# Patient Record
Sex: MALE | Race: BLACK OR AFRICAN AMERICAN | NOT HISPANIC OR LATINO | URBAN - METROPOLITAN AREA
[De-identification: names, ages, dates, MRNs, and addresses within clinical notes are randomized per-mention and may not be internally consistent; named-entity substitution may affect disease eponyms.]

---

## 2017-01-29 ENCOUNTER — EMERGENCY (EMERGENCY)
Facility: HOSPITAL | Age: 22
LOS: 0 days | Discharge: HOME | End: 2017-01-29
Attending: EMERGENCY MEDICINE
Payer: MEDICAID

## 2017-01-29 VITALS
OXYGEN SATURATION: 97 % | HEART RATE: 97 BPM | RESPIRATION RATE: 18 BRPM | DIASTOLIC BLOOD PRESSURE: 83 MMHG | TEMPERATURE: 99 F | SYSTOLIC BLOOD PRESSURE: 123 MMHG

## 2017-01-29 VITALS
WEIGHT: 315 LBS | OXYGEN SATURATION: 95 % | TEMPERATURE: 99 F | RESPIRATION RATE: 21 BRPM | SYSTOLIC BLOOD PRESSURE: 104 MMHG | HEIGHT: 77 IN | HEART RATE: 89 BPM | DIASTOLIC BLOOD PRESSURE: 69 MMHG

## 2017-01-29 DIAGNOSIS — J06.9 ACUTE UPPER RESPIRATORY INFECTION, UNSPECIFIED: ICD-10-CM

## 2017-01-29 DIAGNOSIS — Z91.013 ALLERGY TO SEAFOOD: ICD-10-CM

## 2017-01-29 DIAGNOSIS — R05 COUGH: ICD-10-CM

## 2017-01-29 PROCEDURE — 71020: CPT | Mod: 26

## 2017-01-29 PROCEDURE — 99284 EMERGENCY DEPT VISIT MOD MDM: CPT

## 2017-01-29 RX ORDER — IBUPROFEN 200 MG
600 TABLET ORAL ONCE
Refills: 0 | Status: COMPLETED | OUTPATIENT
Start: 2017-01-29 | End: 2017-01-29

## 2017-01-29 RX ADMIN — Medication 600 MILLIGRAM(S): at 20:44

## 2017-01-29 NOTE — ED PROVIDER NOTE - MEDICAL DECISION MAKING DETAILS
pt appears well and non-toxic. . VSS. Sx have improved during ED stay. No acute events during ED observation period. Precautions given to return to the ED if sx persist or change. Pt expresses understanding and has no further questions. Pt feels comfortable and wishes to be discharged home. Instructed to follow up with PMD in 24 hrs.

## 2017-01-29 NOTE — ED PROVIDER NOTE - PHYSICAL EXAMINATION
GEN: Alert, NAD  HEAD: atraumatic, EOMI, normal lids/conjunctiva  ENT: normal hearing, patent oropharynx without erythema/exudate, uvula midline  NECK: +supple, no tenderness/meningismus, +Trachea midline  PULM: Bilateral BS, normal resp effort, no wheeze/stridor/retractions  CV: RRR, no M/R/G, +dist ext pulses  ABD: soft, NT/ND  BACK: no CVAT, no midline tenderness  MSK: no edema/erythema/cyanosis  SKIN: no rash  NEURO: AAOx3, no sensory/motor deficits, CN 2-12 intact

## 2017-01-29 NOTE — ED PROVIDER NOTE - PRESENTING SYMPTOMS DETAILS
21M no pmhx/shx comes in w dry cough, rhinorrhea, mild sore throat, myalgias past wk or so. denies fevers/chills/n/v/d. has not seen doctor for this yet. has had a headache yesterday that resolved, no neck sitffness  ROS: No fever/chills, No photophobia/eye pain/changes in vision, No ear pain/dysphagia, No chest pain/palpitations, no SOB/wheeze/stridor, No abdominal pain/N/V/D, no dysuria/frequency/discharge, No neck/back pain, no rash, no changes in neurological status/function.

## 2017-01-29 NOTE — ED ADULT NURSE NOTE - OBJECTIVE STATEMENT
paqtient co cold seats , sweating, head congestion, neck pain, productive cough about a week. Patient did not have a flu shot this year. paqtient co hot/ cold seats , sweating, head congestion, neck pain, productive cough about a week. Patient did not have a flu shot this year.

## 2018-01-13 NOTE — ED ADULT NURSE NOTE - PRO INTERPRETER NEED 2
Message  The patient is currently taking 60 mg of Prednisone, he is doing well  He has not had any loose stools  He will take 50 mg of Prednisone starting on Thursday and will call Doreen Juarez on Monday for further instruction  I have stressed the importance of calling Doreen Juarez to report how he is feeling as well as what his next tapered dose of steroids should be  He is agreeable to this plan  Signatures   Electronically signed by : MARISEL Last;  Apr 5 2016  3:54PM EST                       (Author)
English

## 2018-03-31 ENCOUNTER — EMERGENCY (EMERGENCY)
Facility: HOSPITAL | Age: 23
LOS: 0 days | Discharge: HOME | End: 2018-03-31
Attending: EMERGENCY MEDICINE
Payer: SELF-PAY

## 2018-03-31 VITALS
WEIGHT: 315 LBS | TEMPERATURE: 97 F | RESPIRATION RATE: 19 BRPM | HEART RATE: 84 BPM | DIASTOLIC BLOOD PRESSURE: 67 MMHG | SYSTOLIC BLOOD PRESSURE: 141 MMHG | OXYGEN SATURATION: 96 % | HEIGHT: 77 IN

## 2018-03-31 DIAGNOSIS — R05 COUGH: ICD-10-CM

## 2018-03-31 DIAGNOSIS — Z91.013 ALLERGY TO SEAFOOD: ICD-10-CM

## 2018-03-31 DIAGNOSIS — F17.200 NICOTINE DEPENDENCE, UNSPECIFIED, UNCOMPLICATED: ICD-10-CM

## 2018-03-31 PROCEDURE — 99285 EMERGENCY DEPT VISIT HI MDM: CPT

## 2018-03-31 PROCEDURE — 71046 X-RAY EXAM CHEST 2 VIEWS: CPT | Mod: 26

## 2018-03-31 RX ORDER — IPRATROPIUM/ALBUTEROL SULFATE 18-103MCG
3 AEROSOL WITH ADAPTER (GRAM) INHALATION ONCE
Qty: 0 | Refills: 0 | Status: COMPLETED | OUTPATIENT
Start: 2018-03-31 | End: 2018-03-31

## 2018-03-31 RX ORDER — ALBUTEROL 90 UG/1
2 AEROSOL, METERED ORAL
Qty: 1 | Refills: 0
Start: 2018-03-31

## 2018-03-31 RX ADMIN — Medication 3 MILLILITER(S): at 17:03

## 2018-03-31 RX ADMIN — Medication 50 MILLIGRAM(S): at 17:03

## 2018-03-31 NOTE — ED ADULT NURSE NOTE - OBJECTIVE STATEMENT
patient stated that he has been coughing for the past 2 months, yellow sputum, denies fever, denies nausea, denies vomiting

## 2018-03-31 NOTE — ED PROVIDER NOTE - OBJECTIVE STATEMENT
23 y/o obese male no other pmh c/o 2 months of persistent cough. States had uri at beginning, cleared up but cough never went away. +smoking. Has some chest pain when coughing, no chest pain when not actively cough, no sob. No hx dvt/pe, no immobilization, no leg pain/swelling, no surgery/cancer, denies abd pain/n/v/d. Taking robitussin with minimal relief. No uri rhinorrhea/sore throat.    ROS: No fever/chills. No eye pain/changes in vision, No ear pain/sore throat/dysphagia, No palpitations. No SOB No abdominal pain, N/V/D, no black/bloody bm. No dysuria/frequency/discharge, No headache. No Dizziness.    No rashes or breaks in skin. No numbness/tingling/weakness.

## 2018-03-31 NOTE — ED ADULT TRIAGE NOTE - CHIEF COMPLAINT QUOTE
patient c/o of cough  for 2 months , patient stated " I have chest pain sometimes when I cough" , denied chest pain at this time , denied fever, denied pain

## 2019-04-14 ENCOUNTER — EMERGENCY (EMERGENCY)
Facility: HOSPITAL | Age: 24
LOS: 0 days | Discharge: HOME | End: 2019-04-14
Payer: MEDICAID

## 2019-04-14 VITALS
HEART RATE: 83 BPM | RESPIRATION RATE: 19 BRPM | HEIGHT: 77 IN | WEIGHT: 315 LBS | DIASTOLIC BLOOD PRESSURE: 78 MMHG | OXYGEN SATURATION: 96 % | SYSTOLIC BLOOD PRESSURE: 136 MMHG | TEMPERATURE: 98 F

## 2019-04-14 DIAGNOSIS — J02.9 ACUTE PHARYNGITIS, UNSPECIFIED: ICD-10-CM

## 2019-04-14 PROCEDURE — 99283 EMERGENCY DEPT VISIT LOW MDM: CPT

## 2019-04-14 RX ORDER — IBUPROFEN 200 MG
600 TABLET ORAL ONCE
Refills: 0 | Status: COMPLETED | OUTPATIENT
Start: 2019-04-14 | End: 2019-04-14

## 2019-04-14 RX ORDER — DEXAMETHASONE 0.5 MG/5ML
10 ELIXIR ORAL ONCE
Refills: 0 | Status: COMPLETED | OUTPATIENT
Start: 2019-04-14 | End: 2019-04-14

## 2019-04-14 RX ADMIN — Medication 10 MILLIGRAM(S): at 11:44

## 2019-04-14 RX ADMIN — Medication 600 MILLIGRAM(S): at 11:44

## 2019-04-14 NOTE — ED ADULT NURSE NOTE - OBJECTIVE STATEMENT
Patient complains of sore throat with tonsillar swelling, no exudate, cough present before sore throat, no fevers and no lymph node swelling.

## 2019-04-14 NOTE — ED ADULT NURSE NOTE - NSIMPLEMENTINTERV_GEN_ALL_ED
Implemented All Universal Safety Interventions:  [place] to call system. Call bell, personal items and telephone within reach. Instruct patient to call for assistance. Room bathroom lighting operational. Non-slip footwear when patient is off stretcher. Physically safe environment: no spills, clutter or unnecessary equipment. Stretcher in lowest position, wheels locked, appropriate side rails in place.

## 2019-04-14 NOTE — ED PROVIDER NOTE - OBJECTIVE STATEMENT
22 y/o male with no PMH here c/o sore throat x 2 days. pt states pain worse when swallowing. able to eat, just with pain. pt hasn't taken anything for the pain. also reports mild cough. no fevers. no sick contacts. pt otherwise has no other complaints.    ROS: No fever/chills. No eye pain/changes in vision, No ear pain +sore throat no dysphagia, No chest pain/palpitations. No SOB/cough/. No abdominal pain, N/V/D, no black/bloody bm. No dysuria/frequency/discharge, No headache. No Dizziness.    No rashes or breaks in skin. No numbness/tingling/weakness.

## 2019-04-14 NOTE — ED PROVIDER NOTE - PHYSICAL EXAMINATION
Gen: Alert, NAD, not toxic, well appearing  Head: NC, AT, PERRL, EOMI, normal lids/conjunctiva  ENT: B TM WNL, normal hearing, patent oropharynx without exudate, mild erythema and tonsillar enalrgement, uvula midline, no stridor, no trismus, no drooling  Neck: +supple, no tenderness/meningismus/JVD, +Trachea midline  Pulm: Bilateral BS, normal resp effort, no wheeze/stridor/retractions  CV: RRR, no M/R/G, +dist pulses  Abd: soft, NT/ND, +BS, no hepatosplenomegaly  Mskel: no edema/erythema/cyanosis  Skin: no rash  Neuro: AAOx3, no sensory/motor deficits, CN 2-12 intact

## 2019-04-14 NOTE — ED PROVIDER NOTE - CLINICAL SUMMARY MEDICAL DECISION MAKING FREE TEXT BOX
pt here with sore throat, 1 centor criteria, likely viral, will do cx, pt is well appearing, afebrile, in no distress, speaking in full sentences, vss, feels better after pain meds/decadron, will fu with pcp, return precautions given

## 2019-04-14 NOTE — ED ADULT TRIAGE NOTE - CHIEF COMPLAINT QUOTE
patient c/o of sore throat started 3 days ago , denied difficulty breathing , c/o of pain when swallowing , denied difficulty swallowing , c/o of cough denied fever

## 2019-04-16 ENCOUNTER — EMERGENCY (EMERGENCY)
Facility: HOSPITAL | Age: 24
LOS: 0 days | Discharge: HOME | End: 2019-04-16
Payer: MEDICAID

## 2019-04-16 VITALS
OXYGEN SATURATION: 100 % | SYSTOLIC BLOOD PRESSURE: 120 MMHG | HEIGHT: 77 IN | RESPIRATION RATE: 19 BRPM | TEMPERATURE: 98 F | HEART RATE: 96 BPM | WEIGHT: 315 LBS | DIASTOLIC BLOOD PRESSURE: 84 MMHG

## 2019-04-16 DIAGNOSIS — J02.9 ACUTE PHARYNGITIS, UNSPECIFIED: ICD-10-CM

## 2019-04-16 DIAGNOSIS — F17.200 NICOTINE DEPENDENCE, UNSPECIFIED, UNCOMPLICATED: ICD-10-CM

## 2019-04-16 DIAGNOSIS — Z91.013 ALLERGY TO SEAFOOD: ICD-10-CM

## 2019-04-16 LAB
CULTURE RESULTS: SIGNIFICANT CHANGE UP
SPECIMEN SOURCE: SIGNIFICANT CHANGE UP

## 2019-04-16 PROCEDURE — 99283 EMERGENCY DEPT VISIT LOW MDM: CPT

## 2019-04-16 RX ORDER — LIDOCAINE 4 G/100G
15 CREAM TOPICAL
Qty: 100 | Refills: 0
Start: 2019-04-16

## 2019-04-16 RX ORDER — IBUPROFEN 200 MG
1 TABLET ORAL
Qty: 20 | Refills: 0
Start: 2019-04-16 | End: 2019-04-20

## 2019-04-16 NOTE — ED PROVIDER NOTE - OBJECTIVE STATEMENT
24 yo M presents to ED c/o sore throat x 5 days. Pt was seen here 2 days ago for same compliant. Throat culture negative.  Pt reports initial improvement with decadron but still with dysphagia. Pt hasn't taken anything at home for pain  Pt denies nasal congestion, cough, fever/chills, chest pain, SOB.

## 2019-04-16 NOTE — ED PROVIDER NOTE - ENMT, MLM
Airway patent, Nasal mucosa clear. Mouth with normal mucosa. Throat has no vesicles, no oropharyngeal exudates, no tonsillar swelling or erythema and uvula is midline.

## 2019-04-16 NOTE — ED PROVIDER NOTE - CLINICAL SUMMARY MEDICAL DECISION MAKING FREE TEXT BOX
Viral pharyngitis, throat culture negative- pt comfortable, no airway compromise. Will D/c with ibuprofen/magic mouthwash for symptomatic relief. No emergent concerns at this time. Pt stable for DC home.

## 2019-05-02 DIAGNOSIS — E66.01 MORBID (SEVERE) OBESITY DUE TO EXCESS CALORIES: ICD-10-CM

## 2019-05-02 DIAGNOSIS — Z01.818 ENCOUNTER FOR OTHER PREPROCEDURAL EXAMINATION: ICD-10-CM

## 2019-05-29 ENCOUNTER — APPOINTMENT (OUTPATIENT)
Dept: SURGERY | Facility: CLINIC | Age: 24
End: 2019-05-29

## 2019-07-18 ENCOUNTER — APPOINTMENT (OUTPATIENT)
Dept: PEDIATRIC ALLERGY IMMUNOLOGY | Facility: CLINIC | Age: 24
End: 2019-07-18

## 2020-01-01 ENCOUNTER — EMERGENCY (EMERGENCY)
Facility: HOSPITAL | Age: 25
LOS: 0 days | Discharge: HOME | End: 2020-01-01
Attending: EMERGENCY MEDICINE
Payer: MEDICAID

## 2020-01-01 VITALS
RESPIRATION RATE: 17 BRPM | HEART RATE: 110 BPM | SYSTOLIC BLOOD PRESSURE: 140 MMHG | DIASTOLIC BLOOD PRESSURE: 78 MMHG | OXYGEN SATURATION: 100 % | WEIGHT: 315 LBS | HEIGHT: 77 IN | TEMPERATURE: 98 F

## 2020-01-01 VITALS
OXYGEN SATURATION: 100 % | HEART RATE: 89 BPM | DIASTOLIC BLOOD PRESSURE: 72 MMHG | TEMPERATURE: 98 F | SYSTOLIC BLOOD PRESSURE: 130 MMHG | RESPIRATION RATE: 18 BRPM

## 2020-01-01 DIAGNOSIS — Z88.0 ALLERGY STATUS TO PENICILLIN: ICD-10-CM

## 2020-01-01 DIAGNOSIS — S01.81XA LACERATION WITHOUT FOREIGN BODY OF OTHER PART OF HEAD, INITIAL ENCOUNTER: ICD-10-CM
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
## 2020-01-01 DIAGNOSIS — Z23 ENCOUNTER FOR IMMUNIZATION: ICD-10-CM
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
## 2020-01-01 PROCEDURE — 99283 EMERGENCY DEPT VISIT LOW MDM: CPT | Mod: 25

## 2020-01-01 PROCEDURE — 12015 RPR F/E/E/N/L/M 7.6-12.5 CM: CPT

## 2020-01-01 RX ORDER — TETANUS TOXOID, REDUCED DIPHTHERIA TOXOID AND ACELLULAR PERTUSSIS VACCINE, ADSORBED 5; 2.5; 8; 8; 2.5 [IU]/.5ML; [IU]/.5ML; UG/.5ML; UG/.5ML; UG/.5ML
0.5 SUSPENSION INTRAMUSCULAR ONCE
Refills: 0 | Status: COMPLETED | OUTPATIENT
Start: 2020-01-01 | End: 2020-01-01

## 2020-01-01 RX ORDER — TRAMADOL HYDROCHLORIDE 50 MG/1
50 TABLET ORAL ONCE
Refills: 0 | Status: DISCONTINUED | OUTPATIENT
Start: 2020-01-01 | End: 2020-01-01

## 2020-01-01 RX ORDER — IBUPROFEN 200 MG
1 TABLET ORAL
Qty: 20 | Refills: 0
Start: 2020-01-01 | End: 2020-01-05

## 2020-01-01 RX ADMIN — TETANUS TOXOID, REDUCED DIPHTHERIA TOXOID AND ACELLULAR PERTUSSIS VACCINE, ADSORBED 0.5 MILLILITER(S): 5; 2.5; 8; 8; 2.5 SUSPENSION INTRAMUSCULAR at 04:33

## 2020-01-01 RX ADMIN — TRAMADOL HYDROCHLORIDE 50 MILLIGRAM(S): 50 TABLET ORAL at 06:15

## 2020-01-01 RX ADMIN — Medication 1 TABLET(S): at 06:55

## 2020-01-01 RX ADMIN — TRAMADOL HYDROCHLORIDE 50 MILLIGRAM(S): 50 TABLET ORAL at 05:15

## 2020-01-01 NOTE — ED PROVIDER NOTE - PATIENT PORTAL LINK FT
You can access the FollowMyHealth Patient Portal offered by [facility] by registering at the following website: [email or web address]. By joining [employer]’s FollowMyHealth portal, you will also be able to view your health information using other applications (apps) compatible with our system.
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
## 2020-01-01 NOTE — ED PROVIDER NOTE - CLINICAL SUMMARY MEDICAL DECISION MAKING FREE TEXT BOX
pt initially offered plastic surgery repair but states would not want to wait - I sutured wound closed with good approximation, minimum bleeding and good cosmesis - will dc with suture removal planned 5-7 days. 9 sutures placed - 5-0 Prolene

## 2020-01-01 NOTE — ED ADULT NURSE NOTE - NSIMPLEMENTINTERV_GEN_ALL_ED
Implemented All Universal Safety Interventions:  [place] to call system. Call bell, personal items and telephone within reach. Instruct patient to call for assistance. Room bathroom lighting operational. Non-slip footwear when patient is off stretcher. Physically safe environment: no spills, clutter or unnecessary equipment. Stretcher in lowest position, wheels locked, appropriate side rails in place.

## 2020-01-08 ENCOUNTER — EMERGENCY (EMERGENCY)
Facility: HOSPITAL | Age: 25
LOS: 0 days | Discharge: HOME | End: 2020-01-08
Attending: EMERGENCY MEDICINE
Payer: MEDICAID

## 2020-01-08 VITALS
WEIGHT: 315 LBS | RESPIRATION RATE: 20 BRPM | HEIGHT: 77 IN | DIASTOLIC BLOOD PRESSURE: 78 MMHG | SYSTOLIC BLOOD PRESSURE: 148 MMHG | HEART RATE: 86 BPM | TEMPERATURE: 98 F | OXYGEN SATURATION: 97 %

## 2020-01-08 PROCEDURE — 99283 EMERGENCY DEPT VISIT LOW MDM: CPT

## 2020-01-08 RX ORDER — CEFTRIAXONE 500 MG/1
250 INJECTION, POWDER, FOR SOLUTION INTRAMUSCULAR; INTRAVENOUS ONCE
Refills: 0 | Status: COMPLETED | OUTPATIENT
Start: 2020-01-08 | End: 2020-01-08

## 2020-01-08 RX ORDER — VALACYCLOVIR 500 MG/1
1 TABLET, FILM COATED ORAL
Qty: 14 | Refills: 0
Start: 2020-01-08 | End: 2020-01-14

## 2020-01-08 RX ADMIN — CEFTRIAXONE 250 MILLIGRAM(S): 500 INJECTION, POWDER, FOR SOLUTION INTRAMUSCULAR; INTRAVENOUS at 11:41

## 2020-01-08 RX ADMIN — Medication 100 MILLIGRAM(S): at 11:46

## 2020-01-08 NOTE — ED PROVIDER NOTE - PATIENT PORTAL LINK FT
You can access the FollowMyHealth Patient Portal offered by [facility] by registering at the following website: [email or web address]. By joining [employer]’s FollowMyHealth portal, you will also be able to view your health information using other applications (apps) compatible with our system.
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
## 2020-01-08 NOTE — ED PROVIDER NOTE - NSFOLLOWUPCLINICS_GEN_ALL_ED_FT
[facility] General Internal Medicine  General Internal Medicine  [address] [id number]  Phone: [phone]  Fax:   Follow Up Time:

## 2020-01-09 LAB
C TRACH RRNA SPEC QL NAA+PROBE: SIGNIFICANT CHANGE UP
N GONORRHOEA RRNA SPEC QL NAA+PROBE: SIGNIFICANT CHANGE UP
SPECIMEN SOURCE: SIGNIFICANT CHANGE UP

## 2020-01-10 DIAGNOSIS — A60.01 HERPESVIRAL INFECTION OF PENIS: ICD-10-CM

## 2020-01-10 DIAGNOSIS — Z79.1 LONG TERM (CURRENT) USE OF NON-STEROIDAL ANTI-INFLAMMATORIES (NSAID): ICD-10-CM

## 2020-01-10 DIAGNOSIS — Z79.52 LONG TERM (CURRENT) USE OF SYSTEMIC STEROIDS: ICD-10-CM

## 2020-01-10 DIAGNOSIS — Z91.013 ALLERGY TO SEAFOOD: ICD-10-CM

## 2020-01-10 DIAGNOSIS — R21 RASH AND OTHER NONSPECIFIC SKIN ERUPTION: ICD-10-CM

## 2020-01-11 ENCOUNTER — EMERGENCY (EMERGENCY)
Facility: HOSPITAL | Age: 25
LOS: 0 days | Discharge: HOME | End: 2020-01-11
Payer: MEDICAID

## 2020-01-11 VITALS
HEART RATE: 79 BPM | OXYGEN SATURATION: 99 % | DIASTOLIC BLOOD PRESSURE: 89 MMHG | RESPIRATION RATE: 18 BRPM | SYSTOLIC BLOOD PRESSURE: 144 MMHG | HEIGHT: 77 IN | WEIGHT: 315 LBS | TEMPERATURE: 98 F

## 2020-01-11 DIAGNOSIS — L23.9 ALLERGIC CONTACT DERMATITIS, UNSPECIFIED CAUSE: ICD-10-CM

## 2020-01-11 DIAGNOSIS — R21 RASH AND OTHER NONSPECIFIC SKIN ERUPTION: ICD-10-CM

## 2020-01-11 DIAGNOSIS — Z91.013 ALLERGY TO SEAFOOD: ICD-10-CM

## 2020-01-11 PROCEDURE — 99283 EMERGENCY DEPT VISIT LOW MDM: CPT

## 2020-01-11 RX ADMIN — Medication 60 MILLIGRAM(S): at 13:35

## 2020-01-11 NOTE — ED ADULT NURSE NOTE - NSIMPLEMENTINTERV_GEN_ALL_ED
Implemented All Universal Safety Interventions:  [place] to call system. Call bell, personal items and telephone within reach. Instruct patient to call for assistance. Room bathroom lighting operational. Non-slip footwear when patient is off stretcher. Physically safe environment: no spills, clutter or unnecessary equipment. Stretcher in lowest position, wheels locked, appropriate side rails in place.

## 2020-01-11 NOTE — ED ADULT NURSE NOTE - CHPI ED NUR SYMPTOMS NEG
no difficulty breathing/no congestion/no nausea/no vomiting/no difficulty swallowing/no throat itching/no shortness of breath/no wheezing/no swelling of face, tongue

## 2020-01-17 NOTE — ED PROVIDER NOTE - OBJECTIVE STATEMENT
25 y/o M presents to ED c/o skin rash x 4 days pt stated that it all started after he was prescribed multiple antibiotics at once such as doxy, valacyclovir, augmentin, and hydroxyzine he still does not know which one causes the rash no associated symptoms denies fever, headache, N/V dizziness, chest pain and SOB.

## 2020-01-17 NOTE — ED PROVIDER NOTE - SKIN, MLM
Skin normal color for race, warm, dry and intact. maculopapular rash on the torso, and B/L arms non TTP, no induration or fluctuancy.

## 2020-01-17 NOTE — ED PROVIDER NOTE - PATIENT PORTAL LINK FT
You can access the FollowMyHealth Patient Portal offered by [facility] by registering at the following website: [email or web address]. By joining [employer]’s FollowMyHealth portal, you will also be able to view your health information using other applications (apps) compatible with our system.

## 2020-01-17 NOTE — ED PROVIDER NOTE - CLINICAL SUMMARY MEDICAL DECISION MAKING FREE TEXT BOX
25 y/o M presents to ED with skin rash post starting on multiple meds, NAD, VS wnl exam significant for skin rash likely allergic he was dc home with instructions to discontinue all meds and f/u with PMD.

## 2020-08-09 ENCOUNTER — EMERGENCY (EMERGENCY)
Facility: HOSPITAL | Age: 25
LOS: 1 days | Discharge: HOME | End: 2020-08-09
Attending: EMERGENCY MEDICINE | Admitting: EMERGENCY MEDICINE
Payer: COMMERCIAL

## 2020-08-09 VITALS
DIASTOLIC BLOOD PRESSURE: 71 MMHG | HEART RATE: 93 BPM | OXYGEN SATURATION: 98 % | SYSTOLIC BLOOD PRESSURE: 129 MMHG | RESPIRATION RATE: 16 BRPM

## 2020-08-09 VITALS
SYSTOLIC BLOOD PRESSURE: 99 MMHG | HEART RATE: 113 BPM | OXYGEN SATURATION: 95 % | RESPIRATION RATE: 16 BRPM | TEMPERATURE: 98 F | DIASTOLIC BLOOD PRESSURE: 76 MMHG

## 2020-08-09 PROCEDURE — 73562 X-RAY EXAM OF KNEE 3: CPT | Mod: 26,LT

## 2020-08-09 PROCEDURE — 71045 X-RAY EXAM CHEST 1 VIEW: CPT | Mod: 26

## 2020-08-09 PROCEDURE — 73610 X-RAY EXAM OF ANKLE: CPT | Mod: 26,LT

## 2020-08-09 PROCEDURE — 99284 EMERGENCY DEPT VISIT MOD MDM: CPT

## 2020-08-09 PROCEDURE — 73590 X-RAY EXAM OF LOWER LEG: CPT | Mod: 26,LT

## 2020-08-09 PROCEDURE — 73030 X-RAY EXAM OF SHOULDER: CPT | Mod: 26,LT

## 2020-08-09 RX ORDER — IBUPROFEN 200 MG
600 TABLET ORAL ONCE
Refills: 0 | Status: COMPLETED | OUTPATIENT
Start: 2020-08-09 | End: 2020-08-09

## 2020-08-09 RX ORDER — OXYCODONE AND ACETAMINOPHEN 5; 325 MG/1; MG/1
1 TABLET ORAL ONCE
Refills: 0 | Status: DISCONTINUED | OUTPATIENT
Start: 2020-08-09 | End: 2020-08-09

## 2020-08-09 RX ORDER — TETANUS TOXOID, REDUCED DIPHTHERIA TOXOID AND ACELLULAR PERTUSSIS VACCINE, ADSORBED 5; 2.5; 8; 8; 2.5 [IU]/.5ML; [IU]/.5ML; UG/.5ML; UG/.5ML; UG/.5ML
0.5 SUSPENSION INTRAMUSCULAR ONCE
Refills: 0 | Status: COMPLETED | OUTPATIENT
Start: 2020-08-09 | End: 2020-08-09

## 2020-08-09 RX ADMIN — OXYCODONE AND ACETAMINOPHEN 1 TABLET(S): 5; 325 TABLET ORAL at 16:33

## 2020-08-09 RX ADMIN — TETANUS TOXOID, REDUCED DIPHTHERIA TOXOID AND ACELLULAR PERTUSSIS VACCINE, ADSORBED 0.5 MILLILITER(S): 5; 2.5; 8; 8; 2.5 SUSPENSION INTRAMUSCULAR at 15:55

## 2020-08-09 RX ADMIN — OXYCODONE AND ACETAMINOPHEN 1 TABLET(S): 5; 325 TABLET ORAL at 15:33
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
## 2020-08-09 NOTE — ED PROVIDER NOTE - NSFOLLOWUPINSTRUCTIONS_ED_ALL_ED_FT
Take ibuprofen 400mg every 6 hours as needed for pain.  Take percocet only as needed for severe pain. Ice the affected areas to reduce swelling.    Please call or see your doctor or return to the ER if you have new chest pain, shortness of breath, fevers, inability to eat or drink, or worsening of symptoms that brought you to the emergency room today.    Please follow up with your primary care physician in 1-2 days or as soon as possible.
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
## 2020-08-09 NOTE — ED PROVIDER NOTE - PATIENT PORTAL LINK FT
You can access the FollowMyHealth Patient Portal offered by [facility] by registering at the following website: [email or web address]. By joining [email or web address]’s FollowMyHealth portal, you will also be able to view your health information using other applications (apps) compatible with our system.

## 2020-08-09 NOTE — ED PROVIDER NOTE - PROGRESS NOTE DETAILS
XRs w/o acute fracture/dislocation. Pt feeling somewhat better w/ pain meds. Will DC home w/ pmd follow up. Provided return precautions at bedside.

## 2020-08-09 NOTE — ED ADULT NURSE REASSESSMENT NOTE - NS ED NURSE REASSESS COMMENT FT1
Pt respirations even and unlabored. Pt ambulatory to ED exit. Pt provided with dc paper work and instructions.

## 2020-12-10 NOTE — ED PROVIDER NOTE - CARDIAC, MLM
Called patient with no answer. Left voicemail.    Normal rate, regular rhythm.  Heart sounds S1, S2.  No murmurs, rubs or gallops.

## 2021-04-13 NOTE — ED ADULT NURSE NOTE - NS ED NURSE DC INFO COMPLEXITY
Moderate: Comprehensive teaching/Complex: Multiple Rx/Tx. Pt has difficulty understanding. Requires additional help/Verbalized Understanding Ear Star Wedge Flap Text: The defect edges were debeveled with a #15 blade scalpel.  Given the location of the defect and the proximity to free margins (helical rim) an ear star wedge flap was deemed most appropriate.  Using a sterile surgical marker, the appropriate flap was drawn incorporating the defect and placing the expected incisions between the helical rim and antihelix where possible.  The area thus outlined was incised through and through with a #15 scalpel blade.

## 2022-01-17 NOTE — ED ADULT NURSE NOTE - NS PRO PASSIVE SMOKE EXP
Patient : Guero Veras Age: 75 year old Sex: male   MRN: [id number] Encounter Date: 1/17/2022      History     Chief Complaint   Patient presents with   • Abnormal Lab Results     HPI  1A/B01A  1/17/2022  12:13 PM Guero Veras is a 75 year old male who presents to the ED for evaluation of SOB, CP with exertion, dark colored stools, and lightheadedness that began a few weeks ago. He states that he is able to ambulate a little over 100 feet prior to experiencing the pain in his chest. Pt had blood drawn on 01/14/2022 which resulted a Hgb of 5.4. Pt reports that during his most recent hospitalization he did receive a blood transfusion and underwent some sort of GI scope. He showed me his medications list and verified that he is not on any blood thinners. He denies any syncopal episodes. There are no further complaints or modifying factors at this time.    12:13 PM Chart Review: I reviewed the pt's medications, allergies, PMHx and past surgical history in Epic. I specifically reviewed that the pt was admitted on 11/20-24/2021 for anemia. 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      PCP: Michael D D'Amico, MD      No Known Allergies    Current Discharge Medication List      Prior to Admission Medications    Details   furosemide (LASIX) 20 MG tablet Take 1 tablet by mouth daily.  Qty: 30 tablet, Refills: 0      spironolactone (ALDACTONE) 50 MG tablet Take 0.5 tablets by mouth daily.  Qty: 45 tablet, Refills: 1      dextromethorphan (Delsym) 30 MG/5ML liquid Take 10 mLs by mouth 2 times daily as needed for Cough.  Refills: PRN      pantoprazole (PROTONIX) 40 MG tablet Take 1 tablet by mouth 2 times daily.  Qty: 60 tablet, Refills: 1      acetaminophen (TYLENOL) 325 MG tablet Take 2 tablets by mouth every 4 hours as needed for Pain or Fever. Maximum acetaminophen 2000 mg from all sources per day      blood glucose (ONE TOUCH TEST  STRIPS) test strip Test blood sugar 2 times daily as directed. Diagnosis: E11.29 . Meter: One Touch Ultra  Qty: 200 strip, Refills: 3      amLODIPine (NORVASC) 5 MG tablet TAKE 1 TABLET BY MOUTH  DAILY  Qty: 90 tablet, Refills: 1      Blood Glucose Monitoring Suppl (ONE TOUCH ULTRA 2) w/Device Kit 1 kit by Other route as directed.  Qty: 1 kit, Refills: 0      OneTouch Delica Lancets 33G Misc Test blood sugars 2 times daily as directed.  Qty: 200 each, Refills: 3      pioglitazone (ACTOS) 45 MG tablet TAKE 1 TABLET BY MOUTH  DAILY AS DIRECTED  Qty: 90 tablet, Refills: 3      glipiZIDE (GLUCOTROL) 5 MG tablet Take 1 tablet by mouth daily (before breakfast). Indications: Type 2 Diabetes  Qty: 90 tablet, Refills: 1      carvedilol (COREG) 12.5 MG tablet TAKE 1 TABLET BY MOUTH  TWICE DAILY WITH MEALS  Qty: 180 tablet, Refills: 3      ferrous sulfate 325 (65 FE) MG tablet Take 325 mg by mouth daily (with breakfast).      atorvastatin (LIPITOR) 40 MG tablet TAKE 1 TABLET BY MOUTH  DAILY. AS DIRECTED.  Qty: 90 tablet, Refills: 3      aspirin 81 MG EC tablet Take 81 mg by mouth daily.      metformin (GLUCOPHAGE) 1000 MG tablet TAKE 1 TABLET BY MOUTH TWO  TIMES DAILY WITH MEALS  Qty: 180 tablet, Refills: 3      Brinzolamide-Brimonidine 1-0.2 % Suspension Apply 1 drop to eye 2 times daily. Indications: Wide-Angle Glaucoma Right Eye. Per Pts brother, pt takes eye drops once per day.             Past Medical History:   Diagnosis Date   • Acid reflux    • Bilateral bunions 4/13/2018   • Bilateral inguinal hernia    • CAD (coronary artery disease) 2005   • Cerebral infarction (CMS/[facility])    • Development delay    • Diverticula of colon 08/27/2015   • Esophageal varices (CMS/[facility])     S/P banding several times   • Glaucoma     Right eye   • Hammer toe of right foot 9/25/2017   • History of alcoholism (CMS/HCC) 1/14/2020   • History of ETOH abuse     quit 2008    • Hyperlipidemia    • Hypertension    • Iron deficiency anemia 12/2015    • Liver disease 2015   • Lumbar spondylosis     Grade 1 L5-S1 subluxation, mild symptoms   • Myocardial infarction (CMS/HCC)    • Pancytopenia (CMS/HCC) 2005   • Portal hypertensive gastropathy (CMS/HCC) 2015    Severe   • Stomach pain     Since 2015   • Type 2 diabetes mellitus with microalbuminuria (CMS/HCC)     Checks blood sugar at home PRN only   • Wears partial dentures     Upper & Lower       Past Surgical History:   Procedure Laterality Date   • COLONOSCOPY W/ POLYPECTOMY  2015   • CORONARY ANGIOPLASTY WITH STENT PLACEMENT      LAD   • CYSTOGRAM  2016    NL   • ESOPHAGOGASTRODUODENOSCOPY  2016    Banding x 4   • ESOPHAGOGASTRODUODENOSCOPY  3/4/2016    Banding x 5   • ESOPHAGOGASTRODUODENOSCOPY W/ BANDING  2015    Banding x 3   • EYE SURGERY Bilateral     Cataract Extraction with IOL implant    • INGUINAL HERNIA REPAIR Bilateral 2016    KARI, progrip lap, umbilical hernia primary repair (Dr. PENNY Morris)   • LIVER BIOPSY  2016    Mild fibrosis   • UPPER ENDOSCOPIC ULTRASOUND W/ FNA  2015       Family History   Problem Relation Age of Onset   • Heart Father    • Heart Mother    • Arrhythmia Brother         AF   • Heart Sister         Pulmonary hypertension   • Stroke Brother    • Other Brother         GSW       Social History     Tobacco Use   • Smoking status: Former Smoker     Packs/day: 0.00     Years: 50.00     Pack years: 0.00     Types: Cigarettes     Quit date: 2016     Years since quittin.4   • Smokeless tobacco: Never Used   • Tobacco comment: per brother still smokes occasionally   Vaping Use   • Vaping Use: never used   Substance Use Topics   • Alcohol use: No     Alcohol/week: 0.0 standard drinks     Comment: None since    • Drug use: No       E-cigarette/Vaping   • E-Cigarette/Vaping Use Never Used      E-Cigarette/Vaping Substances & Devices       Review of Systems   Constitutional: Negative for chills and fever.    HENT: Negative for congestion, postnasal drip, rhinorrhea, sinus pressure, sore throat and trouble swallowing.    Eyes: Negative for pain and visual disturbance.   Respiratory: Positive for shortness of breath. Negative for cough and chest tightness.    Cardiovascular: Positive for chest pain (exertional). Negative for palpitations and leg swelling.   Gastrointestinal: Negative for abdominal pain, constipation, diarrhea, nausea and vomiting.        Positive for dark colored stools   Genitourinary: Negative for difficulty urinating, dysuria, flank pain, frequency and hematuria.   Musculoskeletal: Negative for back pain, neck pain and neck stiffness.   Skin: Negative for rash.   Neurological: Positive for light-headedness. Negative for weakness, numbness and headaches.       Physical Exam     ED Triage Vitals [01/17/22 1207]   ED Triage Vitals Group      Temp 97.5 °F (36.4 °C)      Heart Rate 88      Resp 20      BP (!) 149/63      SpO2 98 %      EtCO2 mmHg       Height       Weight       Weight Scale Used       BMI (Calculated)       IBW/kg (Calculated)        Physical Exam  Vitals and nursing note reviewed.   Constitutional:       General: He is not in acute distress.     Appearance: He is well-developed.   HENT:      Head: Normocephalic and atraumatic.      Right Ear: External ear normal.      Left Ear: External ear normal.      Nose: Nose normal.   Eyes:      General:         Right eye: No discharge.         Left eye: No discharge.      Conjunctiva/sclera: Conjunctivae normal.   Cardiovascular:      Rate and Rhythm: Normal rate and regular rhythm.      Pulses: Normal pulses.           Radial pulses are 2+ on the right side and 2+ on the left side.        Dorsalis pedis pulses are 2+ on the right side and 2+ on the left side.        Posterior tibial pulses are 2+ on the right side and 2+ on the left side.      Heart sounds: Normal heart sounds. No murmur heard.  No friction rub. No gallop.    Pulmonary:       Effort: Pulmonary effort is normal. No respiratory distress.      Breath sounds: Normal breath sounds. No stridor. No wheezing or rales.   Chest:      Chest wall: No tenderness.   Abdominal:      General: Bowel sounds are normal. There is no distension.      Palpations: Abdomen is soft. There is no mass.      Tenderness: There is no abdominal tenderness. There is no guarding or rebound.   Musculoskeletal:         General: No tenderness. Normal range of motion.      Cervical back: Normal range of motion and neck supple.   Skin:     General: Skin is warm and dry.      Capillary Refill: Capillary refill takes less than 2 seconds.      Findings: No rash.   Neurological:      Mental Status: He is alert and oriented to person, place, and time.      GCS: GCS eye subscore is 4. GCS verbal subscore is 5. GCS motor subscore is 6.      Comments: Face symmetric.  Moves all extremities.   Psychiatric:         Mood and Affect: Mood normal.         Behavior: Behavior normal.         ED Course     Procedures    Lab Results     Results for orders placed or performed during the hospital encounter of 01/17/22   Basic Metabolic Panel   Result Value Ref Range    Fasting Status      Sodium 137 135 - 145 mmol/L    Potassium 4.8 3.4 - 5.1 mmol/L    Chloride 105 98 - 107 mmol/L    Carbon Dioxide 25 21 - 32 mmol/L    Anion Gap 12 10 - 20 mmol/L    Glucose 162 (H) 70 - 99 mg/dL    BUN 33 (H) 6 - 20 mg/dL    Creatinine 1.47 (H) 0.67 - 1.17 mg/dL    Glomerular Filtration Rate 45 (L) >=60    BUN/ Creatinine Ratio 22 7 - 25    Calcium 8.4 8.4 - 10.2 mg/dL   NT proBNP   Result Value Ref Range    NT-proBNP 1,455 (H) <=450 pg/mL   Prothrombin Time   Result Value Ref Range    Prothrombin Time 11.9 (H) 9.7 - 11.8 sec    INR 1.1     Partial Thromboplastin Time   Result Value Ref Range    PTT 27 22 - 30 sec   CBC with Automated Differential (performable only)   Result Value Ref Range    WBC 3.3 (L) 4.2 - 11.0 K/mcL    RBC 1.87 (L) 4.50 - 5.90 mil/mcL     HGB 5.3 (LL) 13.0 - 17.0 g/dL    HCT 18.1 (L) 39.0 - 51.0 %    MCV 96.8 78.0 - 100.0 fl    MCH 28.3 26.0 - 34.0 pg    MCHC 29.3 (L) 32.0 - 36.5 g/dL    RDW-CV 16.3 (H) 11.0 - 15.0 %    RDW-SD 57.4 (H) 39.0 - 50.0 fL    PLT 83 (L) 140 - 450 K/mcL    NRBC 0 <=0 /100 WBC    Neutrophil, Percent 67 %    Lymphocytes, Percent 17 %    Mono, Percent 10 %    Eosinophils, Percent 5 %    Basophils, Percent 1 %    Immature Granulocytes 0 %    Absolute Neutrophils 2.2 1.8 - 7.7 K/mcL    Absolute Lymphocytes 0.6 (L) 1.0 - 4.0 K/mcL    Absolute Monocytes 0.3 0.3 - 0.9 K/mcL    Absolute Eosinophils  0.2 0.0 - 0.5 K/mcL    Absolute Basophils 0.0 0.0 - 0.3 K/mcL    Absolute Immmature Granulocytes 0.0 0.0 - 0.2 K/mcL   TROPONIN I  POINT OF CARE   Result Value Ref Range    TROPONIN I - POINT OF CARE <0.10 <0.10 ng/mL       EKG Results     Completed on 01/17/2022 at 12:25  Results for orders placed or performed during the hospital encounter of 01/17/22   Electrocardiogram 12-Lead   Result Value Ref Range    Ventricular Rate EKG/Min (BPM) 73     Atrial Rate (BPM) 73     SC-Interval (MSEC) 180     QRS-Interval (MSEC) 80     QT-Interval (MSEC) 382     QTc 421     P Axis (Degrees) 40     R Axis (Degrees) 23     T Axis (Degrees) 1     REPORT TEXT       Sinus rhythm  with frequent  premature ventricular complexes  Otherwise normal ECG  When compared with ECG of  20-NOV-2021 16:55,  premature atrial complexes  are no longer  present  Confirmed by MD MAYRA, ANGELITO GONZALEZ ([id number]),  Lissy Olmedo ([id number]) on 1/17/2022 12:28:51 PM       Radiology Results     Imaging Results          XR Chest AP or PA (Final result)  Result time 01/17/22 13:57:36    Final result                 Impression:    IMPRESSION: Persistent bilateral, predominantly peripheral reticular  nodular infiltrates compatible with fibrotic changes visualized on CT  examination with possible small bilateral pleural effusions.                Narrative:    EXAM:  XR CHEST AP  OR PA    DATE and TIME: 1/17/2022 at 1258 hours     CLINICAL HISTORY: SOB    PRIOR EXAM: CT of the chest 11/20/2021 with chest radiograph 11/20/2021    FINDINGS: There are persistent, bilateral reticular nodular infiltrates  that are primarily observed peripherally within each lung. Blunting of the  sulci suggests small pleural effusions. The findings are accentuated by  portable underpenetrated technique. The heart and pulmonary vasculature are  within normal limits for technique.    No pleural effusion is identified.                                    ED Medication Orders (From admission, onward)    None          ED Course as of 01/17/22 1515   Mon Jan 17, 2022   1408 I spoke with Dr. Lewis from .  He will consult. [CL]   1436 I spoke with Mary Anne from hospitalist service.  She accepts the patient under Dr. Guillermo. [CL]      ED Course User Index  [CL] Servando Kelly MD       [facility]  Vitals  Vitals:    01/17/22 1301 01/17/22 1331 01/17/22 1401 01/17/22 1431   BP: 132/56 (!) 147/64 (!) 142/63 111/42   BP Location: LUE - Left upper extremity  LUE - Left upper extremity LUE - Left upper extremity   Patient Position: Sitting  Sitting Sitting   Pulse: 77 63 63 79   Resp: 18  20 (!) 22   Temp:       TempSrc:       SpO2: 94% 97% 96% 94%       ED Course    Initial Impression: Pt presents to the ED with SOB, exertional CP, lightheadedness, and dark colored stools. He has a normal physical exam. Plan for CXR, EKG, and blood work. Patient understands and agrees with the plan.      KHUSHBOO  Critical Care    Due to the presence of acute psychosis my attendance to this patient required critical care time of anemia minutes, including assessment/reassessment, documentation, ordering and interpreting ancillary studies, discussion with ED staff and consultants, patient and their family, and excludes time spent on separately billable procedures.      3:17 PM Does this patient meet Severe Sepsis criteria by CMS SEP-1 definition? No        3:17 PM Does this patient meet Septic Shock criteria by CMS SEP-1 definition? No        Clinical Impression:  ED Diagnoses        Final diagnoses    Symptomatic anemia          Gastrointestinal hemorrhage, unspecified gastrointestinal hemorrhage type                    Pt to be admitted to Dr. Guillermo in stable condition.       I have reviewed the information recorded by the scribe for accuracy and agree with its contents.    ____________________________________________________________________    Lissy Olmedo acting as a scribe for Dr. Servando Kelly  Dictation # [id number]  Scribe: Lissy Kelly MD  01/17/22 7588     No

## 2022-01-26 ENCOUNTER — APPOINTMENT (OUTPATIENT)
Dept: INTERNAL MEDICINE | Facility: CLINIC | Age: 27
End: 2022-01-26

## 2022-03-02 ENCOUNTER — APPOINTMENT (OUTPATIENT)
Dept: INTERNAL MEDICINE | Facility: CLINIC | Age: 27
End: 2022-03-02
Payer: COMMERCIAL

## 2022-03-02 ENCOUNTER — NON-APPOINTMENT (OUTPATIENT)
Age: 27
End: 2022-03-02

## 2022-03-02 VITALS
TEMPERATURE: 98.2 F | WEIGHT: 315 LBS | HEART RATE: 94 BPM | SYSTOLIC BLOOD PRESSURE: 124 MMHG | BODY MASS INDEX: 37.19 KG/M2 | OXYGEN SATURATION: 97 % | HEIGHT: 77 IN | DIASTOLIC BLOOD PRESSURE: 82 MMHG

## 2022-03-02 DIAGNOSIS — R93.89 ABNORMAL FINDINGS ON DIAGNOSTIC IMAGING OF OTHER SPECIFIED BODY STRUCTURES: ICD-10-CM

## 2022-03-02 DIAGNOSIS — Z78.9 OTHER SPECIFIED HEALTH STATUS: ICD-10-CM

## 2022-03-02 DIAGNOSIS — Z00.00 ENCOUNTER FOR GENERAL ADULT MEDICAL EXAMINATION W/OUT ABNORMAL FINDINGS: ICD-10-CM

## 2022-03-02 DIAGNOSIS — F17.200 NICOTINE DEPENDENCE, UNSPECIFIED, UNCOMPLICATED: ICD-10-CM

## 2022-03-02 PROCEDURE — G0442 ANNUAL ALCOHOL SCREEN 15 MIN: CPT

## 2022-03-02 PROCEDURE — 93000 ELECTROCARDIOGRAM COMPLETE: CPT | Mod: 59

## 2022-03-02 PROCEDURE — 99072 ADDL SUPL MATRL&STAF TM PHE: CPT

## 2022-03-02 PROCEDURE — G0444 DEPRESSION SCREEN ANNUAL: CPT | Mod: 59

## 2022-03-02 PROCEDURE — G0447 BEHAVIOR COUNSEL OBESITY 15M: CPT

## 2022-03-02 PROCEDURE — 99385 PREV VISIT NEW AGE 18-39: CPT | Mod: 25

## 2022-03-02 NOTE — COUNSELING
[Potential consequences of obesity discussed] : Potential consequences of obesity discussed [Benefits of weight loss discussed] : Benefits of weight loss discussed [Encouraged to maintain food diary] : Encouraged to maintain food diary [Encouraged to increase physical activity] : Encouraged to increase physical activity [Encouraged to use exercise tracking device] : Encouraged to use exercise tracking device [Target Wt Loss Goal ___] : Weight Loss Goals: Target weight loss goal [unfilled] lbs [Weigh Self Weekly] : weigh self weekly [Decrease Portions] : decrease portions [____ min/wk Activity] : [unfilled] min/wk activity [Keep Food Diary] : keep food diary [Needs reinforcement, provided] : Patient needs reinforcement on understanding of disease, goals and obesity follow-up plan; reinforcement was provided [FreeTextEntry4] : 15

## 2022-03-02 NOTE — PHYSICAL EXAM
[No Acute Distress] : no acute distress [No Lymphadenopathy] : no lymphadenopathy [No Respiratory Distress] : no respiratory distress  [No Accessory Muscle Use] : no accessory muscle use [Clear to Auscultation] : lungs were clear to auscultation bilaterally [Normal Rate] : normal rate  [Regular Rhythm] : with a regular rhythm [Normal S1, S2] : normal S1 and S2 [No Murmur] : no murmur heard [No Edema] : there was no peripheral edema [No Spinal Tenderness] : no spinal tenderness [No Focal Deficits] : no focal deficits [Normal Gait] : normal gait [Normal Affect] : the affect was normal [Alert and Oriented x3] : oriented to person, place, and time [de-identified] : morbidly obese

## 2022-03-02 NOTE — HEALTH RISK ASSESSMENT
[Good] : ~his/her~  mood as  good [Current] : Current [Monthly or less (1 pt)] : Monthly or less (1 point) [1 or 2 (0 pts)] : 1 or 2 (0 points) [Never (0 pts)] : Never (0 points) [Yes] : In the past 12 months have you used drugs other than those required for medical reasons? Yes [No falls in past year] : Patient reported no falls in the past year [0] : 1) Little interest or pleasure doing things: Not at all (0) [3] : 2) Feeling down, depressed, or hopeless for nearly every day (3) [HIV Test offered] : HIV Test offered [Hepatitis C test offered] : Hepatitis C test offered [Alone] : lives alone [Employed] : employed [Significant Other] : lives with significant other [# Of Children ___] : has [unfilled] children [Feels Safe at Home] : Feels safe at home [Reports changes in vision] : Reports changes in vision [Smoke Detector] : smoke detector [Carbon Monoxide Detector] : carbon monoxide detector [Seat Belt] :  uses seat belt [Audit-CScore] : 1 [de-identified] : Marijuana [de-identified] : None [de-identified] : Poor diet [[id number]] : 3 [Reports changes in hearing] : Reports no changes in hearing [Reports changes in dental health] : Reports no changes in dental health [Sunscreen] : does not use sunscreen

## 2022-03-02 NOTE — HISTORY OF PRESENT ILLNESS
[de-identified] : 27 yo M here for CPE. He is aware of his weight but has not tried to lose any yet. He would like a bariatric consultation, however. He currently smokes cigarettes and marijuana. He has been smoking cigarettes since age 17 and marijuana since age 10. He has tried to quit but was only successful while incarcerated [facility] for 2 years. He plans to try the nicotine gum again. He does not like the lozenges and states that the patches do not stick. \par \par He is concerned about a possible abnormal thyroid scan in the past and wants to re-investigate it. \par \par Also needs [employer] employment form filled out.

## 2022-03-03 ENCOUNTER — NON-APPOINTMENT (OUTPATIENT)
Age: 27
End: 2022-03-03

## 2022-03-03 LAB
ALBUMIN SERPL ELPH-MCNC: 4.3 G/DL
ALP BLD-CCNC: 59 U/L
ALT SERPL-CCNC: 40 U/L
ANION GAP SERPL CALC-SCNC: 14 MMOL/L
AST SERPL-CCNC: 27 U/L
BASOPHILS # BLD AUTO: 0.12 K/UL
BASOPHILS NFR BLD AUTO: 1.4 %
BILIRUB SERPL-MCNC: 0.2 MG/DL
BUN SERPL-MCNC: 14 MG/DL
CALCIUM SERPL-MCNC: 9.4 MG/DL
CHLORIDE SERPL-SCNC: 104 MMOL/L
CHOLEST SERPL-MCNC: 194 MG/DL
CO2 SERPL-SCNC: 21 MMOL/L
CREAT SERPL-MCNC: 0.9 MG/DL
EGFR: 119 ML/MIN/1.73M2
EOSINOPHIL # BLD AUTO: 0.43 K/UL
EOSINOPHIL NFR BLD AUTO: 4.9 %
ESTIMATED AVERAGE GLUCOSE: 120 MG/DL
GLUCOSE SERPL-MCNC: 91 MG/DL
HBA1C MFR BLD HPLC: 5.8 %
HCT VFR BLD CALC: 45.8 %
HDLC SERPL-MCNC: 51 MG/DL
HGB BLD-MCNC: 15.9 G/DL
HIV1+2 AB SPEC QL IA.RAPID: NONREACTIVE
IMM GRANULOCYTES NFR BLD AUTO: 0.7 %
LDLC SERPL CALC-MCNC: 122 MG/DL
LYMPHOCYTES # BLD AUTO: 3.42 K/UL
LYMPHOCYTES NFR BLD AUTO: 39 %
MAN DIFF?: NORMAL
MCHC RBC-ENTMCNC: 27.3 PG
MCHC RBC-ENTMCNC: 34.7 GM/DL
MCV RBC AUTO: 78.7 FL
MONOCYTES # BLD AUTO: 0.55 K/UL
MONOCYTES NFR BLD AUTO: 6.3 %
NEUTROPHILS # BLD AUTO: 4.2 K/UL
NEUTROPHILS NFR BLD AUTO: 47.7 %
NONHDLC SERPL-MCNC: 144 MG/DL
PLATELET # BLD AUTO: 323 K/UL
POTASSIUM SERPL-SCNC: 4.5 MMOL/L
PROT SERPL-MCNC: 7 G/DL
RBC # BLD: 5.82 M/UL
RBC # FLD: 14.4 %
SODIUM SERPL-SCNC: 138 MMOL/L
TRIGL SERPL-MCNC: 108 MG/DL
TSH SERPL-ACNC: 0.6 UIU/ML
WBC # FLD AUTO: 8.78 K/UL

## 2023-09-14 ENCOUNTER — APPOINTMENT (OUTPATIENT)
Dept: INTERNAL MEDICINE | Facility: CLINIC | Age: 28
End: 2023-09-14

## 2023-09-18 ENCOUNTER — APPOINTMENT (OUTPATIENT)
Dept: INTERNAL MEDICINE | Facility: CLINIC | Age: 28
End: 2023-09-18
Payer: COMMERCIAL

## 2023-09-18 VITALS
DIASTOLIC BLOOD PRESSURE: 88 MMHG | WEIGHT: 315 LBS | OXYGEN SATURATION: 97 % | HEIGHT: 77 IN | BODY MASS INDEX: 37.19 KG/M2 | TEMPERATURE: 97.9 F | SYSTOLIC BLOOD PRESSURE: 128 MMHG | HEART RATE: 66 BPM

## 2023-09-18 DIAGNOSIS — Z02.1 ENCOUNTER FOR PRE-EMPLOYMENT EXAMINATION: ICD-10-CM

## 2023-09-18 PROCEDURE — 99213 OFFICE O/P EST LOW 20 MIN: CPT

## 2023-09-29 PROBLEM — Z02.1 PRE-EMPLOYMENT EXAMINATION: Status: ACTIVE | Noted: 2023-09-29

## 2024-01-17 NOTE — ED ADULT NURSE NOTE - OBJECTIVE STATEMENT
Attending Attestation (For Attendings USE Only)... Pt c/o rash red and itching s/p started antibiotic lungs clear pt acyanotic resp unlabored

## 2024-04-25 NOTE — ED ADULT TRIAGE NOTE - NS ED TRIAGE AVPU SCALE
Alert-The patient is alert, awake and responds to voice. The patient is oriented to time, place, and person. The triage nurse is able to obtain subjective information.
Speaking Coherently

## 2024-08-28 NOTE — ED PROVIDER NOTE - CROS ED ROS STATEMENT
Pt taken out via wheelchair. Pt has belongings, home with boyfriend. Pt to increase omeprazole to 2x daily    all other ROS negative except as per HPI

## 2025-01-15 NOTE — ED PROVIDER NOTE - CPE EDP RESP NORM
I attest my time as attending is greater than 50% of the total combined time spent on qualifying patient care activities by the PA/NP and attending.
normal...

## 2025-02-10 ENCOUNTER — APPOINTMENT (OUTPATIENT)
Dept: INTERNAL MEDICINE | Facility: CLINIC | Age: 30
End: 2025-02-10